# Patient Record
Sex: FEMALE | Race: BLACK OR AFRICAN AMERICAN | Employment: FULL TIME | URBAN - METROPOLITAN AREA
[De-identification: names, ages, dates, MRNs, and addresses within clinical notes are randomized per-mention and may not be internally consistent; named-entity substitution may affect disease eponyms.]

---

## 2017-02-06 ENCOUNTER — OFFICE VISIT (OUTPATIENT)
Dept: OBGYN CLINIC | Age: 27
End: 2017-02-06

## 2017-02-06 VITALS
BODY MASS INDEX: 26.25 KG/M2 | HEIGHT: 68 IN | WEIGHT: 173.2 LBS | SYSTOLIC BLOOD PRESSURE: 120 MMHG | DIASTOLIC BLOOD PRESSURE: 72 MMHG | HEART RATE: 92 BPM

## 2017-02-06 DIAGNOSIS — Z30.09 FAMILY PLANNING: ICD-10-CM

## 2017-02-06 DIAGNOSIS — Z30.09 FAMILY PLANNING: Primary | ICD-10-CM

## 2017-02-07 ENCOUNTER — CLINICAL SUPPORT (OUTPATIENT)
Dept: OBGYN CLINIC | Age: 27
End: 2017-02-07

## 2017-02-07 VITALS
HEART RATE: 81 BPM | SYSTOLIC BLOOD PRESSURE: 122 MMHG | BODY MASS INDEX: 26.22 KG/M2 | HEIGHT: 68 IN | DIASTOLIC BLOOD PRESSURE: 79 MMHG | WEIGHT: 173 LBS

## 2017-02-07 DIAGNOSIS — Z30.09 FAMILY PLANNING: Primary | ICD-10-CM

## 2017-02-07 LAB
HCG INTACT+B SERPL-ACNC: <1 MIU/ML
HCG URINE, QL. (POC): NEGATIVE
VALID INTERNAL CONTROL?: YES

## 2017-05-02 ENCOUNTER — CLINICAL SUPPORT (OUTPATIENT)
Dept: OBGYN CLINIC | Age: 27
End: 2017-05-02

## 2017-05-02 VITALS
HEIGHT: 68 IN | WEIGHT: 163.8 LBS | HEART RATE: 97 BPM | SYSTOLIC BLOOD PRESSURE: 113 MMHG | DIASTOLIC BLOOD PRESSURE: 70 MMHG | BODY MASS INDEX: 24.83 KG/M2

## 2017-05-02 DIAGNOSIS — Z30.42 DEPO-PROVERA CONTRACEPTIVE STATUS: Primary | ICD-10-CM

## 2017-05-02 LAB
HCG URINE, QL. (POC): NEGATIVE
VALID INTERNAL CONTROL?: YES

## 2017-05-02 NOTE — PROGRESS NOTES
Patient received depo injection, tolerated well, left with no complications.  Next depo is due Jul 21- Aug 4

## 2017-07-10 ENCOUNTER — HOSPITAL ENCOUNTER (EMERGENCY)
Age: 27
Discharge: HOME | End: 2017-07-10
Attending: EMERGENCY MEDICINE
Payer: MEDICAID

## 2017-07-10 VITALS
TEMPERATURE: 98.9 F | SYSTOLIC BLOOD PRESSURE: 135 MMHG | WEIGHT: 162 LBS | HEIGHT: 68 IN | DIASTOLIC BLOOD PRESSURE: 83 MMHG | BODY MASS INDEX: 24.55 KG/M2 | HEART RATE: 87 BPM | OXYGEN SATURATION: 100 % | RESPIRATION RATE: 16 BRPM

## 2017-07-10 DIAGNOSIS — T85.848A DENTAL IMPLANT PAIN, INITIAL ENCOUNTER: Primary | ICD-10-CM

## 2017-07-10 PROCEDURE — 99282 EMERGENCY DEPT VISIT SF MDM: CPT

## 2017-07-10 RX ORDER — PENICILLIN V POTASSIUM 500 MG/1
500 TABLET, FILM COATED ORAL 4 TIMES DAILY
Qty: 28 TAB | Refills: 0 | Status: SHIPPED | OUTPATIENT
Start: 2017-07-10 | End: 2017-07-17

## 2017-07-10 RX ORDER — TRAMADOL HYDROCHLORIDE 50 MG/1
50 TABLET ORAL
Qty: 20 TAB | Refills: 0 | Status: SHIPPED | OUTPATIENT
Start: 2017-07-10

## 2017-07-10 NOTE — ED PROVIDER NOTES
HPI Comments: Pt with complaint of R upper molar dental pain. She states she does not have a primary dentist \"but I've called around\". She denies any fevers or chills, is able to tolerate PO and secretions. No other acute symptoms or complaints were noted. Past Medical History:   Diagnosis Date    Pyelonephritis 6/9/2016       No past surgical history on file. Family History:   Problem Relation Age of Onset    No Known Problems Mother     No Known Problems Father        Social History     Social History    Marital status: SINGLE     Spouse name: N/A    Number of children: N/A    Years of education: N/A     Occupational History    Not on file. Social History Main Topics    Smoking status: Never Smoker    Smokeless tobacco: Never Used    Alcohol use No    Drug use: No    Sexual activity: Yes     Partners: Male     Birth control/ protection: Condom     Other Topics Concern    Not on file     Social History Narrative         ALLERGIES: Onion    Review of Systems   Constitutional: Negative for chills and fever. HENT: Positive for dental problem. Negative for drooling and trouble swallowing. Eyes: Negative for visual disturbance. Respiratory: Negative for shortness of breath. Cardiovascular: Negative for chest pain. Gastrointestinal: Negative for abdominal pain, nausea and vomiting. Endocrine: Negative. Genitourinary: Negative. Musculoskeletal: Negative. Skin: Negative for rash. Allergic/Immunologic: Negative. Neurological: Negative for headaches. Hematological: Negative for adenopathy. Vitals:    07/10/17 1254   BP: 135/83   Pulse: 87   Resp: 16   Temp: 98.9 °F (37.2 °C)   SpO2: 100%   Weight: 73.5 kg (162 lb)   Height: 5' 8\" (1.727 m)            Physical Exam   Constitutional: She is oriented to person, place, and time. She appears well-developed and well-nourished. No distress.    Resting comfortably on stretcher   HENT:   Head: Normocephalic and atraumatic. MM moist.  Decayed and broken R upper posterior molar. No evidence of periapical abscess   Eyes: Conjunctivae and EOM are normal.   Sclera clear bilaterally   Neck: Neck supple. No JVD present. Non-tender to palpation   Cardiovascular: Normal rate, regular rhythm and normal heart sounds. Exam reveals no gallop and no friction rub. No murmur heard. Pulmonary/Chest: Effort normal and breath sounds normal. No respiratory distress. She has no wheezes. She has no rales. She exhibits no tenderness. No crepitance with palpation   Abdominal: Soft. She exhibits no distension. There is no tenderness. Genitourinary:   Genitourinary Comments: No CVA tenderness   Musculoskeletal: She exhibits no tenderness. Normal inspection of upper extremities. No edema noted to bilateral lower extremities   Lymphadenopathy:     She has no cervical adenopathy. Neurological: She is alert and oriented to person, place, and time. Normal motor and sensation bilaterally to upper and lower extremities   Skin: Skin is warm and dry. She is not diaphoretic. Psychiatric:   Normal mood and affect. Vitals reviewed. MDM  Number of Diagnoses or Management Options  Dental implant pain, initial encounter:   Diagnosis management comments: Pt with dental pain and broken tooth, no systemic symptoms.  Will Rx for symptoms and refer to dentist.    Risk of Complications, Morbidity, and/or Mortality  Presenting problems: low  Management options: low    Patient Progress  Patient progress: stable    ED Course       Procedures

## 2017-07-25 ENCOUNTER — TELEPHONE (OUTPATIENT)
Dept: OBGYN CLINIC | Age: 27
End: 2017-07-25

## 2017-07-25 DIAGNOSIS — Z30.09 GENERAL COUNSELING AND ADVICE FOR CONTRACEPTIVE MANAGEMENT: ICD-10-CM

## 2017-07-25 DIAGNOSIS — Z30.09 FAMILY PLANNING: ICD-10-CM

## 2017-07-25 RX ORDER — MEDROXYPROGESTERONE ACETATE 150 MG/ML
150 INJECTION, SUSPENSION INTRAMUSCULAR ONCE
Qty: 1 ML | Refills: 3 | Status: SHIPPED | OUTPATIENT
Start: 2017-07-25 | End: 2017-07-25

## 2017-07-26 ENCOUNTER — CLINICAL SUPPORT (OUTPATIENT)
Dept: OBGYN CLINIC | Age: 27
End: 2017-07-26

## 2017-07-26 VITALS
SYSTOLIC BLOOD PRESSURE: 117 MMHG | WEIGHT: 161.25 LBS | HEART RATE: 80 BPM | BODY MASS INDEX: 24.44 KG/M2 | DIASTOLIC BLOOD PRESSURE: 71 MMHG | HEIGHT: 68 IN

## 2017-07-26 DIAGNOSIS — Z30.09 FAMILY PLANNING: Primary | ICD-10-CM

## 2017-07-26 LAB
HCG URINE, QL. (POC): NEGATIVE
VALID INTERNAL CONTROL?: YES

## 2017-07-27 DIAGNOSIS — Z30.09 FAMILY PLANNING: Primary | ICD-10-CM

## 2017-07-27 RX ORDER — MEDROXYPROGESTERONE ACETATE 150 MG/ML
150 INJECTION, SUSPENSION INTRAMUSCULAR ONCE
Qty: 1 ML | Refills: 6 | Status: SHIPPED | OUTPATIENT
Start: 2017-07-27 | End: 2017-07-27

## 2017-10-16 ENCOUNTER — CLINICAL SUPPORT (OUTPATIENT)
Dept: OBGYN CLINIC | Age: 27
End: 2017-10-16

## 2017-10-16 VITALS
WEIGHT: 160 LBS | BODY MASS INDEX: 25.71 KG/M2 | HEIGHT: 66 IN | DIASTOLIC BLOOD PRESSURE: 74 MMHG | HEART RATE: 86 BPM | SYSTOLIC BLOOD PRESSURE: 118 MMHG

## 2017-10-16 DIAGNOSIS — Z30.42 FAMILY PLANNING, DEPO-PROVERA CONTRACEPTION MONITORING/ADMINISTRATION: Primary | ICD-10-CM

## 2017-10-16 LAB
HCG URINE, QL. (POC): NEGATIVE
VALID INTERNAL CONTROL?: YES

## 2018-01-09 ENCOUNTER — CLINICAL SUPPORT (OUTPATIENT)
Dept: OBGYN CLINIC | Age: 28
End: 2018-01-09

## 2018-01-09 VITALS
HEIGHT: 66 IN | BODY MASS INDEX: 26.13 KG/M2 | WEIGHT: 162.6 LBS | SYSTOLIC BLOOD PRESSURE: 116 MMHG | HEART RATE: 74 BPM | DIASTOLIC BLOOD PRESSURE: 70 MMHG

## 2018-01-09 DIAGNOSIS — Z30.09 FAMILY PLANNING: Primary | ICD-10-CM

## 2018-01-09 NOTE — PROGRESS NOTES
Pt in today for Depo Injection. Noted that the pt's last Well Woman was 8/24/16. Pt made aware that she will need to schedule a Well Woman and be seen before any other refill or injections will be approved. Next  Pt verbalized understanding. Injection given in Left Deltoid without any complaints, Pt tolerated well and left office in stable condition.

## 2018-01-09 NOTE — MR AVS SNAPSHOT
Visit Information Date & Time Provider Department Dept. Phone Encounter #  
 1/9/2018  3:00 PM Sherryle SoleDamion [id number] Follow-up Instructions Return in about 3 months (around 4/9/2018) for depo. Your Appointments 1/25/2018 10:00 AM  
ANNUAL with Sherryle Sole, MD  
[facility] ([address] [place] [address]) Appt Note: annual  
 [address], [place] [address] [place] [address] [facility] [address]  
[phone]  
  
   
 [address], [address] [address] [address] [address]  
  
    
 4/2/2018  2:15 PM  
DEPO with Kennedy Berumen CNM [facility] ([facility] Appt Note: depo [address], [place] [address] [facility] [address]  
[phone]  
  
   
 [address], [address] [address] [address] [address] Upcoming Health Maintenance Date Due  
 HPV AGE 9Y-34Y (1 of 3 - Female 3 Dose Series) 2/5/2002 Influenza Age 5 to Adult 8/1/2017 PAP AKA CERVICAL CYTOLOGY 4/13/2018 Allergies as of 1/9/2018  Review Complete On: 7/27/2017 By: Sherryle Sole, MD  
  
 Severity Noted Reaction Type Reactions Onion Low 06/29/2016    Rash, Itching Current Immunizations  Reviewed on 6/9/2016 No immunizations on file. Not reviewed this visit You Were Diagnosed With   
  
 Codes Comments Family planning    -  Primary ICD-10-CM: Z30.09 
ICD-9-CM: V25.09 Vitals BP Pulse Height(growth percentile) Weight(growth percentile) BMI OB Status 116/70 (BP 1 Location: Right arm, BP Patient Position: Sitting) 74 5' 6\" (1.676 m) 162 lb 9.6 oz (73.8 kg) 26.24 kg/m2 Injection Smoking Status Never Smoker BMI and BSA Data Body Mass Index Body Surface Area  
 26.24 kg/m 2 1.85 m 2 Preferred Pharmacy Pharmacy Name Phone [address], [address] [facility] [phone] [facility] [phone] Your Updated Medication List  
 This list is accurate as of: 1/9/18 11:59 PM.  Always use your most recent med list.  
  
  
  
  
 ferrous sulfate 325 mg (65 mg iron) tablet Take 1 Tab by mouth two (2) times daily (with meals). ibuprofen 800 mg tablet Commonly known as:  MOTRIN Take 1 Tab by mouth every eight (8) hours as needed. oxyCODONE-acetaminophen 5-325 mg per tablet Commonly known as:  PERCOCET Take 1-2 Tabs by mouth every six (6) hours as needed. Max Daily Amount: 8 Tabs. prenatal vit-calcium-iron-fa 27 mg iron- 1 mg Tab Commonly known as:  PRENATAL VITAMIN PLUS LOW IRON Take 1 Tab by mouth daily. Indications: PREGNANCY  
  
 senna-docusate 8.6-50 mg per tablet Commonly known as:  Mariangel Arm Take 1 Tab by mouth two (2) times daily as needed for Constipation. traMADol 50 mg tablet Commonly known as:  ULTRAM  
Take 1 Tab by mouth every six (6) hours as needed for Pain. Max Daily Amount: 200 mg. We Performed the Following IN MEDROXYPROGESTERONE ACETATE, 1MG [ [facility]] IN THER/PROPH/DIAG INJECTION, SUBCUT/IM [id number] CPT(R)] Follow-up Instructions Return in about 3 months (around 4/9/2018) for depo. Patient Instructions Shot for [employer] Control: Care Instructions Your Care Instructions The shot is used to prevent pregnancy. You get the shot in your upper arm or rear end (buttocks). The shot gives you a dose of the hormone progestin. The shot is often called by its brand name, Depo-Provera. The shot provides birth control for 3 months at a time. You then need another shot. Follow-up care is a key part of your treatment and safety. Be sure to make and go to all appointments, and call your doctor if you are having problems. It's also a good idea to know your test results and keep a list of the medicines you take. How can you care for yourself at home? How do you use the birth control shot? · If you get the shot during the first 5 days of your normal period, use backup birth control, such as a condom, or don't have intercourse for 24 hours. · If you get the shot more than 5 days after your periods starts, use backup birth control or don't have intercourse for 5 days. · Talk to your doctor about a schedule to get the shot. You need the shot every 3 months. If you are late getting it, you'll need backup birth control. What if you miss or are late for a shot? Always read the label for specific instructions, or call your doctor. Here are some basic guidelines: · Use backup birth control, such as a condom, or don't have intercourse. Continue using one of these methods until 5 days after you get the missed or late shot. · If you had intercourse, you can use emergency contraception, such as the morning-after pill (Plan B). You can use emergency contraception for up to 5 days after having had sex, but it works best if you take it right away. What else do you need to know? · The shot has side effects. Because the shot protects for 3 months, the side effects may last 3 months. ¨ You may have changes in your period and your period may stop. You may also have spotting or bleeding between periods. ¨ You may have mood changes, less interest in sex, or weight gain. · The shot may cause bone loss. Talk to your doctor about this and take steps to prevent bone loss, such as getting enough calcium in your diet and exercising regularly. · Check with your doctor before you use any other medicines, including over-the-counter medicines, vitamins, herbal products, and supplements. Birth control hormones may not work as well to prevent pregnancy when combined with other medicines. · The shot doesn't protect against sexually transmitted infections (STIs), such as herpes or HIV/AIDS. If you're not sure whether your sex partner might have an STI, use a condom to protect against infection. When should you call for help? Watch closely for changes in your health, and be sure to contact your doctor if you have any problems. Where can you learn more? Go to http://mariah-garfield.info/. Enter D220 in the search box to learn more about \"Shot for Birth Control: Care Instructions. \" Current as of: March 16, 2017 Content Version: 11.4 © 7155-0616 [employer]. Care instructions adapted under license by [employer] (which disclaims liability or warranty for this information). If you have questions about a medical condition or this instruction, always ask your healthcare professional. [place] [address] any warranty or liability for your use of this information. Introducing [facility]! [address] introduces [employer] patient portal. Now you can access parts of your medical record, email your doctor's office, and request medication refills online. 1. In your internet browser, go to https://[employer]. "[employer] 2. Click on the First Time User? Click Here link in the Sign In box. You will see the New Member Sign Up page. 3. Enter your [employer] Access Code exactly as it appears below. You will not need to use this code after youve completed the sign-up process. If you do not sign up before the expiration date, you must request a new code. · [employer] Access Code: [email or web address] Expires: 4/14/2018  2:55 PM 
 
4. Enter the last four digits of your Social Security Number (xxxx) and Date of Birth (mm/dd/yyyy) as indicated and click Submit. You will be taken to the next sign-up page. 5. Create a [employer] ID. This will be your [employer] login ID and cannot be changed, so think of one that is secure and easy to remember. 6. Create a [employer] password. You can change your password at any time. 7. Enter your Password Reset Question and Answer. This can be used at a later time if you forget your password. 8. Enter your e-mail address. You will receive e-mail notification when new information is available in [address] 9. Click Sign Up. You can now view and download portions of your medical record. 10. Click the Download Summary menu link to download a portable copy of your medical information. If you have questions, please visit the Frequently Asked Questions section of the [employer] website. Remember, [employer] is NOT to be used for urgent needs. For medical emergencies, dial 911. Now available from your iPhone and Android! Please provide this summary of care documentation to your next provider. Your primary care clinician is listed as [address]. If you have any questions after today's visit, please call [phone].

## 2018-05-07 ENCOUNTER — OFFICE VISIT (OUTPATIENT)
Dept: OBGYN CLINIC | Age: 28
End: 2018-05-07

## 2018-05-07 VITALS
RESPIRATION RATE: 16 BRPM | DIASTOLIC BLOOD PRESSURE: 76 MMHG | OXYGEN SATURATION: 100 % | WEIGHT: 161.6 LBS | SYSTOLIC BLOOD PRESSURE: 125 MMHG | HEART RATE: 89 BPM | BODY MASS INDEX: 25.97 KG/M2 | TEMPERATURE: 97.7 F | HEIGHT: 66 IN

## 2018-05-07 DIAGNOSIS — Z01.419 WELL WOMAN EXAM WITH ROUTINE GYNECOLOGICAL EXAM: Primary | ICD-10-CM

## 2018-05-07 DIAGNOSIS — Z01.419 WELL WOMAN EXAM WITH ROUTINE GYNECOLOGICAL EXAM: ICD-10-CM

## 2018-05-07 DIAGNOSIS — Z30.09 FAMILY PLANNING: ICD-10-CM

## 2018-05-07 LAB
HCG URINE, QL. (POC): NEGATIVE
VALID INTERNAL CONTROL?: YES

## 2018-05-07 NOTE — PROGRESS NOTES
Subjective:   33 y.o. female for Well Woman Check. No LMP recorded. Patient has had an injection. Social History: single partner, contraception - Depo-Provera injections. Pertinent past medical hstory: no history of HTN, DVT, CAD, DM, liver disease, migraines or smoking. ROS:  Feeling well. No dyspnea or chest pain on exertion. No abdominal pain, change in bowel habits, black or bloody stools. No urinary tract symptoms. GYN ROS: normal menses, no abnormal bleeding, pelvic pain or discharge, no breast pain or new or enlarging lumps on self exam. No neurological complaints. Objective:     Visit Vitals    /76 (BP 1 Location: Left arm, BP Patient Position: Sitting)    Pulse 89    Temp 97.7 °F (36.5 °C) (Oral)    Resp 16    Ht 5' 6\" (1.676 m)    Wt 161 lb 9.6 oz (73.3 kg)    SpO2 100%    BMI 26.08 kg/m2     The patient appears well, alert, oriented x 3, in no distress. ENT normal.  Neck supple. No adenopathy or thyromegaly. PAUL. Lungs are clear, good air entry, no wheezes, rhonchi or rales. S1 and S2 normal, no murmurs, regular rate and rhythm. Abdomen soft without tenderness, guarding, mass or organomegaly. Extremities show no edema, normal peripheral pulses. Neurological is normal, no focal findings. BREAST EXAM: breasts appear normal, no suspicious masses, no skin or nipple changes or axillary nodes    PELVIC EXAM: normal external genitalia, vulva, vagina, cervix, uterus and adnexa    Assessment/Plan:   well woman  Family Planning. pap smear  return annually or prn    ICD-10-CM ICD-9-CM    1. Well woman exam with routine gynecological exam Z01.419 V72.31 PAP IG, CT-NG-TV, RFX APTIMA HPV ASCUS ([id number])   2. Family planning Z30.09 V25.09 AMB POC URINE PREGNANCY TEST, VISUAL COLOR COMPARISON      DC MEDROXYPROGESTERONE ACETATE, 1MG      DC THER/PROPH/DIAG INJECTION, SUBCUT/IM   3.  Well woman exam with routine gynecological exam Z01.419 V72.31 PAP IG, CT-NG-TV, RFX APTIMA HPV ASCUS ([id number])    [V72.31]   .

## 2018-05-07 NOTE — PATIENT INSTRUCTIONS
Shot for [employer] Control: Care Instructions  Your Care Instructions  The shot is used to prevent pregnancy. You get the shot in your upper arm or rear end (buttocks). The shot gives you a dose of the hormone progestin. The shot is often called by its brand name, Depo-Provera. The shot provides birth control for 3 months at a time. You then need another shot. Follow-up care is a key part of your treatment and safety. Be sure to make and go to all appointments, and call your doctor if you are having problems. It's also a good idea to know your test results and keep a list of the medicines you take. How can you care for yourself at home? How do you use the birth control shot? · If you get the shot during the first 5 days of your normal period, use backup birth control, such as a condom, or don't have intercourse for 24 hours. · If you get the shot more than 5 days after your periods starts, use backup birth control or don't have intercourse for 5 days. · Talk to your doctor about a schedule to get the shot. You need the shot every 3 months. If you are late getting it, you'll need backup birth control. What if you miss or are late for a shot? Always read the label for specific instructions, or call your doctor. Here are some basic guidelines:  · Use backup birth control, such as a condom, or don't have intercourse. Continue using one of these methods until 5 days after you get the missed or late shot. · If you had intercourse, you can use emergency contraception, such as the morning-after pill (Plan B). You can use emergency contraception for up to 5 days after having had sex, but it works best if you take it right away. What else do you need to know? · The shot has side effects. Because the shot protects for 3 months, the side effects may last 3 months. ¨ You may have changes in your period and your period may stop. You may also have spotting or bleeding between periods.   ¨ You may have mood changes, less interest in sex, or weight gain. · The shot may cause bone loss. Talk to your doctor about this and take steps to prevent bone loss, such as getting enough calcium in your diet and exercising regularly. · Check with your doctor before you use any other medicines, including over-the-counter medicines, vitamins, herbal products, and supplements. Birth control hormones may not work as well to prevent pregnancy when combined with other medicines. · The shot doesn't protect against sexually transmitted infections (STIs), such as herpes or HIV/AIDS. If you're not sure whether your sex partner might have an STI, use a condom to protect against infection. When should you call for help? Watch closely for changes in your health, and be sure to contact your doctor if you have any problems. Where can you learn more? Go to http://mariah-garfield.info/. Enter G743 in the search box to learn more about \"Shot for Birth Control: Care Instructions. \"  Current as of: March 16, 2017  Content Version: 11.4  © 8204-6875 [employer]. Care instructions adapted under license by [employer] (which disclaims liability or warranty for this information). If you have questions about a medical condition or this instruction, always ask your healthcare professional. [place] [address] any warranty or liability for your use of this information. Well Visit, Ages 25 to 48: Care Instructions  Your Care Instructions    Physical exams can help you stay healthy. Your doctor has checked your overall health and may have suggested ways to take good care of yourself. He or she also may have recommended tests. At home, you can help prevent illness with healthy eating, regular exercise, and other steps. Follow-up care is a key part of your treatment and safety. Be sure to make and go to all appointments, and call your doctor if you are having problems.  It's also a good idea to know your test results and keep a list of the medicines you take. How can you care for yourself at home? · Reach and stay at a healthy weight. This will lower your risk for many problems, such as obesity, diabetes, heart disease, and high blood pressure. · Get at least 30 minutes of physical activity on most days of the week. Walking is a good choice. You also may want to do other activities, such as running, swimming, cycling, or playing tennis or team sports. Discuss any changes in your exercise program with your doctor. · Do not smoke or allow others to smoke around you. If you need help quitting, talk to your doctor about stop-smoking programs and medicines. These can increase your chances of quitting for good. · Talk to your doctor about whether you have any risk factors for sexually transmitted infections (STIs). Having one sex partner (who does not have STIs and does not have sex with anyone else) is a good way to avoid these infections. · Use birth control if you do not want to have children at this time. Talk with your doctor about the choices available and what might be best for you. · Protect your skin from too much sun. When you're outdoors from 10 a.m. to 4 p.m., stay in the shade or cover up with clothing and a hat with a wide brim. Wear sunglasses that block UV rays. Even when it's cloudy, put broad-spectrum sunscreen (SPF 30 or higher) on any exposed skin. · See a dentist one or two times a year for checkups and to have your teeth cleaned. · Wear a seat belt in the car. · Drink alcohol in moderation, if at all. That means no more than 2 drinks a day for men and 1 drink a day for women. Follow your doctor's advice about when to have certain tests. These tests can spot problems early. For everyone  · Cholesterol. Have the fat (cholesterol) in your blood tested after age 21.  Your doctor will tell you how often to have this done based on your age, family history, or other things that can increase your risk for heart disease. · Blood pressure. Have your blood pressure checked during a routine doctor visit. Your doctor will tell you how often to check your blood pressure based on your age, your blood pressure results, and other factors. · Vision. Talk with your doctor about how often to have a glaucoma test.  · Diabetes. Ask your doctor whether you should have tests for diabetes. · Colon cancer. Have a test for colon cancer at age 48. You may have one of several tests. If you are younger than 48, you may need a test earlier if you have any risk factors. Risk factors include whether you already had a precancerous polyp removed from your colon or whether your parent, brother, sister, or child has had colon cancer. For women  · Breast exam and mammogram. Talk to your doctor about when you should have a clinical breast exam and a mammogram. Medical experts differ on whether and how often women under 50 should have these tests. Your doctor can help you decide what is right for you. · Pap test and pelvic exam. Begin Pap tests at age 24. A Pap test is the best way to find cervical cancer. The test often is part of a pelvic exam. Ask how often to have this test.  · Tests for sexually transmitted infections (STIs). Ask whether you should have tests for STIs. You may be at risk if you have sex with more than one person, especially if your partners do not wear condoms. For men  · Tests for sexually transmitted infections (STIs). Ask whether you should have tests for STIs. You may be at risk if you have sex with more than one person, especially if you do not wear a condom. · Testicular cancer exam. Ask your doctor whether you should check your testicles regularly. · Prostate exam. Talk to your doctor about whether you should have a blood test (called a PSA test) for prostate cancer.  Experts differ on whether and when men should have this test. Some experts suggest it if you are older than 39 and are -American or have a father or brother who got prostate cancer when he was younger than 72. When should you call for help? Watch closely for changes in your health, and be sure to contact your doctor if you have any problems or symptoms that concern you. Where can you learn more? Go to http://mariah-garfield.info/. Enter P072 in the search box to learn more about \"Well Visit, Ages 25 to 48: Care Instructions. \"  Current as of: May 12, 2017  Content Version: 11.4  © 5945-0336 [employer]wise[employer]. Care instructions adapted under license by [employer] (which disclaims liability or warranty for this information). If you have questions about a medical condition or this instruction, always ask your healthcare professional. [address] any warranty or liability for your use of this information.

## 2018-05-07 NOTE — MR AVS SNAPSHOT
[facility] 
 
 
 [address], [address] [facility] [address] 
[phone] Patient: Saleem Blount MRN: [id number] KGF:[birth year] Visit Information Date & Time Provider Department Dept. Phone Encounter #  
 5/7/2018  3:30 PM Damion Adair [id number] Follow-up Instructions Return in 3 months (on 8/7/2018). Your Appointments 7/23/2018  3:30 PM  
DEPO with Jie Inman MD  
[facility] ([address]) Appt Note: DEPO  
 [address] [facility] [address]  
[phone]  
  
   
 [address], [address] [place] Upcoming Health Maintenance Date Due  
 PAP AKA CERVICAL CYTOLOGY 4/13/2018 Influenza Age 5 to Adult 8/1/2018 Allergies as of 5/7/2018  Review Complete On: 5/7/2018 By: Jie Inman MD  
  
 Severity Noted Reaction Type Reactions Onion Low 06/29/2016    Rash, Itching Current Immunizations  Reviewed on 6/9/2016 No immunizations on file. Not reviewed this visit You Were Diagnosed With   
  
 Codes Comments Well woman exam with routine gynecological exam    -  Primary ICD-10-CM: O42.799 ICD-9-CM: V72.31 Family planning     ICD-10-CM: Z30.09 
ICD-9-CM: V25.09 Well woman exam with routine gynecological exam     ICD-10-CM: I20.917 ICD-9-CM: V72.31 [V72.31] Vitals BP Pulse Temp Resp Height(growth percentile) Weight(growth percentile) 125/76 (BP 1 Location: Left arm, BP Patient Position: Sitting) 89 97.7 °F (36.5 °C) (Oral) 16 5' 6\" (1.676 m) 161 lb 9.6 oz (73.3 kg) SpO2 BMI OB Status Smoking Status 100% 26.08 kg/m2 Injection Never Smoker BMI and BSA Data Body Mass Index Body Surface Area 26.08 kg/m 2 1.85 m 2 Preferred Pharmacy Pharmacy Name Phone  [address] [facility] [phone] Your Updated Medication List  
  
   
This list is accurate as of 5/7/18 10:50 PM.  Always use your most recent med list.  
  
  
  
  
 ferrous sulfate 325 mg (65 mg iron) tablet Take 1 Tab by mouth two (2) times daily (with meals). ibuprofen 800 mg tablet Commonly known as:  MOTRIN Take 1 Tab by mouth every eight (8) hours as needed. oxyCODONE-acetaminophen 5-325 mg per tablet Commonly known as:  PERCOCET Take 1-2 Tabs by mouth every six (6) hours as needed. Max Daily Amount: 8 Tabs. prenatal vit-calcium-iron-fa 27 mg iron- 1 mg Tab Commonly known as:  PRENATAL VITAMIN PLUS LOW IRON Take 1 Tab by mouth daily. Indications: PREGNANCY  
  
 senna-docusate 8.6-50 mg per tablet Commonly known as:  [place] Take 1 Tab by mouth two (2) times daily as needed for Constipation. traMADol 50 mg tablet Commonly known as:  ULTRAM  
Take 1 Tab by mouth every six (6) hours as needed for Pain. Max Daily Amount: 200 mg. We Performed the Following AMB POC URINE PREGNANCY TEST, VISUAL COLOR COMPARISON [75703 CPT(R)] NC MEDROXYPROGESTERONE ACETATE, 1MG [ [facility]] NC THER/PROPH/DIAG INJECTION, SUBCUT/IM [id number] CPT(R)] Follow-up Instructions Return in 3 months (on 8/7/2018). Patient Instructions Shot for [employer] Control: Care Instructions Your Care Instructions The shot is used to prevent pregnancy. You get the shot in your upper arm or rear end (buttocks). The shot gives you a dose of the hormone progestin. The shot is often called by its brand name, Depo-Provera. The shot provides birth control for 3 months at a time. You then need another shot. Follow-up care is a key part of your treatment and safety. Be sure to make and go to all appointments, and call your doctor if you are having problems. It's also a good idea to know your test results and keep a list of the medicines you take. How can you care for yourself at home? How do you use the birth control shot? · If you get the shot during the first 5 days of your normal period, use backup birth control, such as a condom, or don't have intercourse for 24 hours. · If you get the shot more than 5 days after your periods starts, use backup birth control or don't have intercourse for 5 days. · Talk to your doctor about a schedule to get the shot. You need the shot every 3 months. If you are late getting it, you'll need backup birth control. What if you miss or are late for a shot? Always read the label for specific instructions, or call your doctor. Here are some basic guidelines: · Use backup birth control, such as a condom, or don't have intercourse. Continue using one of these methods until 5 days after you get the missed or late shot. · If you had intercourse, you can use emergency contraception, such as the morning-after pill (Plan B). You can use emergency contraception for up to 5 days after having had sex, but it works best if you take it right away. What else do you need to know? · The shot has side effects. Because the shot protects for 3 months, the side effects may last 3 months. ¨ You may have changes in your period and your period may stop. You may also have spotting or bleeding between periods. ¨ You may have mood changes, less interest in sex, or weight gain. · The shot may cause bone loss. Talk to your doctor about this and take steps to prevent bone loss, such as getting enough calcium in your diet and exercising regularly. · Check with your doctor before you use any other medicines, including over-the-counter medicines, vitamins, herbal products, and supplements. Birth control hormones may not work as well to prevent pregnancy when combined with other medicines. · The shot doesn't protect against sexually transmitted infections (STIs), such as herpes or HIV/AIDS.  If you're not sure whether your sex partner might have an STI, use a condom to protect against infection. When should you call for help? Watch closely for changes in your health, and be sure to contact your doctor if you have any problems. Where can you learn more? Go to http://mariah-garfield.info/. Enter L760 in the search box to learn more about \"Shot for Birth Control: Care Instructions. \" Current as of: March 16, 2017 Content Version: 11.4 © 6581-8469 [employer]. Care instructions adapted under license by [employer] (which disclaims liability or warranty for this information). If you have questions about a medical condition or this instruction, always ask your healthcare professional. [address] any warranty or liability for your use of this information. Well Visit, Ages 25 to 48: Care Instructions Your Care Instructions Physical exams can help you stay healthy. Your doctor has checked your overall health and may have suggested ways to take good care of yourself. He or she also may have recommended tests. At home, you can help prevent illness with healthy eating, regular exercise, and other steps. Follow-up care is a key part of your treatment and safety. Be sure to make and go to all appointments, and call your doctor if you are having problems. It's also a good idea to know your test results and keep a list of the medicines you take. How can you care for yourself at home? · Reach and stay at a healthy weight. This will lower your risk for many problems, such as obesity, diabetes, heart disease, and high blood pressure. · Get at least 30 minutes of physical activity on most days of the week. Walking is a good choice. You also may want to do other activities, such as running, swimming, cycling, or playing tennis or team sports. Discuss any changes in your exercise program with your doctor. · Do not smoke or allow others to smoke around you.  If you need help quitting, talk to your doctor about stop-smoking programs and medicines. These can increase your chances of quitting for good. · Talk to your doctor about whether you have any risk factors for sexually transmitted infections (STIs). Having one sex partner (who does not have STIs and does not have sex with anyone else) is a good way to avoid these infections. · Use birth control if you do not want to have children at this time. Talk with your doctor about the choices available and what might be best for you. · Protect your skin from too much sun. When you're outdoors from 10 a.m. to 4 p.m., stay in the shade or cover up with clothing and a hat with a wide brim. Wear sunglasses that block UV rays. Even when it's cloudy, put broad-spectrum sunscreen (SPF 30 or higher) on any exposed skin. · See a dentist one or two times a year for checkups and to have your teeth cleaned. · Wear a seat belt in the car. · Drink alcohol in moderation, if at all. That means no more than 2 drinks a day for men and 1 drink a day for women. Follow your doctor's advice about when to have certain tests. These tests can spot problems early. For everyone · Cholesterol. Have the fat (cholesterol) in your blood tested after age 21. Your doctor will tell you how often to have this done based on your age, family history, or other things that can increase your risk for heart disease. · Blood pressure. Have your blood pressure checked during a routine doctor visit. Your doctor will tell you how often to check your blood pressure based on your age, your blood pressure results, and other factors. · Vision. Talk with your doctor about how often to have a glaucoma test. 
· Diabetes. Ask your doctor whether you should have tests for diabetes. · Colon cancer. Have a test for colon cancer at age 48. You may have one of several tests.  If you are younger than 48, you may need a test earlier if you have any risk factors. Risk factors include whether you already had a precancerous polyp removed from your colon or whether your parent, brother, sister, or child has had colon cancer. For women · Breast exam and mammogram. Talk to your doctor about when you should have a clinical breast exam and a mammogram. Medical experts differ on whether and how often women under 50 should have these tests. Your doctor can help you decide what is right for you. · Pap test and pelvic exam. Begin Pap tests at age 24. A Pap test is the best way to find cervical cancer. The test often is part of a pelvic exam. Ask how often to have this test. 
· Tests for sexually transmitted infections (STIs). Ask whether you should have tests for STIs. You may be at risk if you have sex with more than one person, especially if your partners do not wear condoms. For men · Tests for sexually transmitted infections (STIs). Ask whether you should have tests for STIs. You may be at risk if you have sex with more than one person, especially if you do not wear a condom. · Testicular cancer exam. Ask your doctor whether you should check your testicles regularly. · Prostate exam. Talk to your doctor about whether you should have a blood test (called a PSA test) for prostate cancer. Experts differ on whether and when men should have this test. Some experts suggest it if you are older than 39 and are -American or have a father or brother who got prostate cancer when he was younger than 72. When should you call for help? Watch closely for changes in your health, and be sure to contact your doctor if you have any problems or symptoms that concern you. Where can you learn more? Go to http://mariah-garfield.info/. Enter P072 in the search box to learn more about \"Well Visit, Ages 25 to 48: Care Instructions. \" Current as of: May 12, 2017 Content Version: 11.4 © 3080-1664 [employer]wise[employer] Incorporated. Care instructions adapted under license by [employer] (which disclaims liability or warranty for this information). If you have questions about a medical condition or this instruction, always ask your healthcare professional. [address] any warranty or liability for your use of this information. Introducing [facility]! [facility] introduces DossierView patient portal. Now you can access parts of your medical record, email your doctor's office, and request medication refills online. 1. In your internet browser, go to [email or web address] Watch-Sites/[email or web address] 2. Click on the First Time User? Click Here link in the Sign In box. You will see the New Member Sign Up page. 3. Enter your DossierView Access Code exactly as it appears below. You will not need to use this code after youve completed the sign-up process. If you do not sign up before the expiration date, you must request a new code. · DossierView Access Code: [email or web address] Expires: 7/21/2018  5:24 AM 
 
4. Enter the last four digits of your Social Security Number (xxxx) and Date of Birth (mm/dd/yyyy) as indicated and click Submit. You will be taken to the next sign-up page. 5. Create a DossierView ID. This will be your DossierView login ID and cannot be changed, so think of one that is secure and easy to remember. 6. Create a DossierView password. You can change your password at any time. 7. Enter your Password Reset Question and Answer. This can be used at a later time if you forget your password. 8. Enter your e-mail address. You will receive e-mail notification when new information is available in [address] 9. Click Sign Up. You can now view and download portions of your medical record. 10. Click the Download Summary menu link to download a portable copy of your medical information.  
 
If you have questions, please visit the Frequently Asked Questions section of the [employer]. Remember, [employer] is NOT to be used for urgent needs. For medical emergencies, dial 911. Now available from your iPhone and Android! Please provide this summary of care documentation to your next provider. Your primary care clinician is listed as [address]. If you have any questions after today's visit, please call [phone].

## 2018-05-23 LAB
C TRACH RRNA CVX QL NAA+PROBE: NEGATIVE
CYTOLOGIST CVX/VAG CYTO: NORMAL
CYTOLOGY CVX/VAG DOC THIN PREP: NORMAL
DX ICD CODE: NORMAL
LABCORP, 190119: NORMAL
Lab: NORMAL
N GONORRHOEA RRNA CVX QL NAA+PROBE: NEGATIVE
OTHER STN SPEC: NORMAL
PATH REPORT.FINAL DX SPEC: NORMAL
STAT OF ADQ CVX/VAG CYTO-IMP: NORMAL
T VAGINALIS RRNA SPEC QL NAA+PROBE: NEGATIVE

## 2018-08-13 RX ORDER — MEDROXYPROGESTERONE ACETATE 150 MG/ML
150 INJECTION, SUSPENSION INTRAMUSCULAR
Qty: 1 ML | Refills: 3 | Status: SHIPPED | OUTPATIENT
Start: 2018-08-13 | End: 2018-08-23 | Stop reason: SDUPTHER

## 2018-08-23 RX ORDER — MEDROXYPROGESTERONE ACETATE 150 MG/ML
150 INJECTION, SUSPENSION INTRAMUSCULAR
Qty: 1 ML | Refills: 3 | Status: SHIPPED | OUTPATIENT
Start: 2018-08-23

## 2018-08-24 ENCOUNTER — CLINICAL SUPPORT (OUTPATIENT)
Dept: OBGYN CLINIC | Age: 28
End: 2018-08-24

## 2018-08-24 VITALS
BODY MASS INDEX: 25.75 KG/M2 | DIASTOLIC BLOOD PRESSURE: 76 MMHG | HEIGHT: 66 IN | TEMPERATURE: 98.5 F | OXYGEN SATURATION: 100 % | WEIGHT: 160.2 LBS | RESPIRATION RATE: 18 BRPM | HEART RATE: 76 BPM | SYSTOLIC BLOOD PRESSURE: 127 MMHG

## 2018-08-24 DIAGNOSIS — Z30.42 ENCOUNTER FOR DEPO-PROVERA CONTRACEPTION: Primary | ICD-10-CM

## 2018-08-24 LAB
HCG URINE, QL. (POC): NEGATIVE
VALID INTERNAL CONTROL?: YES

## 2018-08-24 RX ORDER — MEDROXYPROGESTERONE ACETATE 150 MG/ML
150 INJECTION, SUSPENSION INTRAMUSCULAR ONCE
Qty: 1 ML | Refills: 0 | Status: SHIPPED | COMMUNITY
Start: 2018-08-24 | End: 2018-08-24

## 2018-08-24 NOTE — PROGRESS NOTES
Date last pap: 05/07/18. Last Depo-Provera: 05/07/18. Side Effects if any: n/a. Serum HCG indicated? n/a. Depo-Provera 150 mg IM given by: Jess Linn RN . Next appointment due 11/09-11/23. Injection given in LEFT deltoid, patient tolerated well and left in no distress.

## 2018-12-10 ENCOUNTER — CLINICAL SUPPORT (OUTPATIENT)
Dept: OBGYN CLINIC | Age: 28
End: 2018-12-10

## 2018-12-10 VITALS
SYSTOLIC BLOOD PRESSURE: 119 MMHG | HEIGHT: 66 IN | HEART RATE: 87 BPM | BODY MASS INDEX: 26.45 KG/M2 | OXYGEN SATURATION: 100 % | TEMPERATURE: 98.8 F | RESPIRATION RATE: 18 BRPM | DIASTOLIC BLOOD PRESSURE: 78 MMHG | WEIGHT: 164.6 LBS

## 2018-12-10 DIAGNOSIS — Z30.09 FAMILY PLANNING: Primary | ICD-10-CM

## 2018-12-10 LAB
HCG URINE, QL. (POC): NEGATIVE
VALID INTERNAL CONTROL?: YES

## 2018-12-10 NOTE — PROGRESS NOTES
Pt in for Depo injection. Injection given in right deltoid. Pt tolerated well, voiced no complaints or concerns,made aware that her next Depo is due between Fe. [id number]. Pt verbalized understanding.

## 2020-01-01 ENCOUNTER — APPOINTMENT (OUTPATIENT)
Dept: GENERAL RADIOLOGY | Age: 30
End: 2020-01-01
Attending: NURSE PRACTITIONER
Payer: MEDICAID

## 2020-01-01 ENCOUNTER — HOSPITAL ENCOUNTER (EMERGENCY)
Age: 30
Discharge: HOME | End: 2020-01-01
Attending: EMERGENCY MEDICINE
Payer: MEDICAID

## 2020-01-01 VITALS
SYSTOLIC BLOOD PRESSURE: 133 MMHG | DIASTOLIC BLOOD PRESSURE: 81 MMHG | RESPIRATION RATE: 18 BRPM | HEART RATE: 91 BPM | TEMPERATURE: 98.6 F | OXYGEN SATURATION: 100 %

## 2020-01-01 DIAGNOSIS — S63.630A SPRAIN OF INTERPHALANGEAL JOINT OF RIGHT INDEX FINGER, INITIAL ENCOUNTER: Primary | ICD-10-CM

## 2020-01-01 PROCEDURE — 99281 EMR DPT VST MAYX REQ PHY/QHP: CPT

## 2020-01-01 PROCEDURE — 73140 X-RAY EXAM OF FINGER(S): CPT

## 2020-01-01 RX ORDER — IBUPROFEN 800 MG/1
800 TABLET ORAL
Qty: 20 TAB | Refills: 0 | Status: SHIPPED | OUTPATIENT
Start: 2020-01-01 | End: 2020-01-08

## 2020-01-02 NOTE — DISCHARGE INSTRUCTIONS
Patient Education        Finger Sprain: Care Instructions  Overview    A sprain is an injury to the tough fibers (ligaments) that connect bone to bone. This injury can happen in joints such as in your finger. Some sprains stretch the ligaments but don't tear them. More severe sprains can partly or completely tear the ligaments. Sprains can cause pain and swelling. It may take weeks to months before your finger can move easily and without pain. Resting the finger for a short time after the injury can help you heal. To keep the injured finger in position while it heals, your doctor may have put a splint on it. Or the doctor may have taped the finger to the one next to it. After the pain and swelling have gone down, your doctor may recommend exercises to strengthen your finger or more treatment if needed. Follow-up care is a key part of your treatment and safety. Be sure to make and go to all appointments, and call your doctor if you are having problems. It's also a good idea to know your test results and keep a list of the medicines you take. How can you care for yourself at home? · If your doctor put a splint on your finger, wear the splint as directed. Don't remove it until your doctor says it's okay. · If your fingers are taped together, make sure that the tape is snug. But it shouldn't be so tight that the fingers get numb or tingle. You can loosen the tape if it's too tight. If you need to retape your fingers, always put padding between the fingers before you put on the new tape. · Put ice or a cold pack on your finger for 10 to 20 minutes at a time. Try to do this every 1 to 2 hours for the first 3 days (when you are awake) or until the swelling goes down. Put a thin cloth between the ice and your skin. · Prop up your hand on a pillow when you ice it or anytime you sit or lie down during the next 3 days. Try to keep it above the level of your heart. This will help reduce swelling.   · Be safe with medicines. Read and follow all instructions on the label. ? If the doctor gave you a prescription medicine for pain, take it as prescribed. ? If you are not taking a prescription pain medicine, ask your doctor if you can take an over-the-counter medicine. · If your doctor recommends exercises, do them as directed. When should you call for help? Call your doctor now or seek immediate medical care if:    · You have new or worse pain.     · Your finger is cool or pale or changes color.     · Your finger is tingly, weak, or numb.    Watch closely for changes in your health, and be sure to contact your doctor if:    · You do not get better as expected. Where can you learn more? Go to http://mariah-garfield.info/. Enter F155 in the search box to learn more about \"Finger Sprain: Care Instructions. \"  Current as of: June 26, 2019  Content Version: 12.2  © 4213-8611 [employer]. Care instructions adapted under license by [employer] (which disclaims liability or warranty for this information). If you have questions about a medical condition or this instruction, always ask your healthcare professional. [place] [address] any warranty or liability for your use of this information.

## 2020-01-02 NOTE — ED TRIAGE NOTES
Patient A/O x 4, complaining of right index finger swelling and pain x Monday. Patient states her finger got jammed in daughter's car seat.

## 2020-09-25 ENCOUNTER — OFFICE VISIT (OUTPATIENT)
Dept: ORTHOPEDIC SURGERY | Age: 30
End: 2020-09-25
Payer: MEDICAID

## 2020-09-25 VITALS
WEIGHT: 147.4 LBS | HEART RATE: 61 BPM | TEMPERATURE: 97.7 F | HEIGHT: 66 IN | DIASTOLIC BLOOD PRESSURE: 78 MMHG | BODY MASS INDEX: 23.69 KG/M2 | OXYGEN SATURATION: 97 % | SYSTOLIC BLOOD PRESSURE: 110 MMHG

## 2020-09-25 DIAGNOSIS — S83.241A ACUTE MEDIAL MENISCUS TEAR OF RIGHT KNEE, INITIAL ENCOUNTER: Primary | ICD-10-CM

## 2020-09-25 DIAGNOSIS — S83.241A ACUTE MEDIAL MENISCUS TEAR OF RIGHT KNEE, INITIAL ENCOUNTER: ICD-10-CM

## 2020-09-25 PROCEDURE — 99203 OFFICE O/P NEW LOW 30 MIN: CPT | Performed by: PHYSICIAN ASSISTANT

## 2020-09-25 NOTE — LETTER
NOTIFICATION RETURN TO WORK / SCHOOL 
 
9/25/2020 2:03 PM 
 
Ms. Darryle Music [address] [facility] [address] [facility] [address] To Whom It May Concern: 
 
Darryle Music is currently under the care of Ofe Nguyen. She was seen in our office for an appointment today, 9/25/2020. She will be out of work x 2 weeks. If there are questions or concerns please have the patient contact our office. Sincerely, BIA Stiles

## 2020-09-25 NOTE — PROGRESS NOTES
Emani Gonzales  1990   Chief Complaint   Patient presents with    Knee Pain     right knee pain        HISTORY OF PRESENT ILLNESS  Emani Gonzales is a 35 y.o. female who presents today for evaluation of right knee pain. Pain started on 9/5. She was on a motor scooter and put her foot down and started having sharp pains. Patient describes the pain as sharp and stabbing that is Constant in nature. Symptoms are worse with weight bearing, walking and is better with  rest.  Associated symptoms include weakness, Swelling. Since problem started, it: is unchanged. Pain does not wake patient up at night. Has taken nsaids for the problem. Pain is a 8/10. Has tried following treatments: Injections:NO; Brace:YES; Therapy:NO; Cane/Crutch:NO   Pain Assessment  9/25/2020   Location of Pain Knee   Location Modifiers Right   Severity of Pain 8   Quality of Pain Sharp; Throbbing   Duration of Pain Persistent   Duration of Pain Comment patient only experiences pain when she puts weight on her right side and walking   Date Pain First Started 9/5/2020   Aggravating Factors Walking   Limiting Behavior Yes   Relieving Factors Heat   Result of Injury Yes           Allergies   Allergen Reactions    Onion Rash and Itching        Past Medical History:   Diagnosis Date    Pyelonephritis 6/9/2016      Social History     Socioeconomic History    Marital status: SINGLE     Spouse name: Not on file    Number of children: Not on file    Years of education: Not on file    Highest education level: Not on file   Occupational History    Not on file   Social Needs    Financial resource strain: Not on file    Food insecurity     Worry: Not on file     Inability: Not on file    Transportation needs     Medical: Not on file     Non-medical: Not on file   Tobacco Use    Smoking status: Never Smoker    Smokeless tobacco: Never Used   Substance and Sexual Activity    Alcohol use: No    Drug use: No    Sexual activity: Yes Partners: Male     Birth control/protection: Condom   Lifestyle    Physical activity     Days per week: Not on file     Minutes per session: Not on file    Stress: Not on file   Relationships    Social connections     Talks on phone: Not on file     Gets together: Not on file     Attends Alevism service: Not on file     Active member of club or organization: Not on file     Attends meetings of clubs or organizations: Not on file     Relationship status: Not on file    Intimate partner violence     Fear of current or ex partner: Not on file     Emotionally abused: Not on file     Physically abused: Not on file     Forced sexual activity: Not on file   Other Topics Concern    Not on file   Social History Narrative    Not on file      History reviewed. No pertinent surgical history. Family History   Problem Relation Age of Onset    No Known Problems Mother     No Known Problems Father       Current Outpatient Medications   Medication Sig    medroxyPROGESTERone (DEPO-PROVERA) 150 mg/mL injection 1 mL by IntraMUSCular route every three (3) months.  traMADol (ULTRAM) 50 mg tablet Take 1 Tab by mouth every six (6) hours as needed for Pain. Max Daily Amount: 200 mg.  ferrous sulfate 325 mg (65 mg iron) tablet Take 1 Tab by mouth two (2) times daily (with meals).  ibuprofen (MOTRIN) 800 mg tablet Take 1 Tab by mouth every eight (8) hours as needed.  oxyCODONE-acetaminophen (PERCOCET) 5-325 mg per tablet Take 1-2 Tabs by mouth every six (6) hours as needed. Max Daily Amount: 8 Tabs.  senna-docusate (PERICOLACE) 8.6-50 mg per tablet Take 1 Tab by mouth two (2) times daily as needed for Constipation.  prenatal vit-calcium-iron-fa (PRENATAL VITAMINS LOW IRON) 27 mg iron- 1 mg tab Take 1 Tab by mouth daily. Indications: PREGNANCY     No current facility-administered medications for this visit.         REVIEW OF SYSTEM   Patient denies: Weight loss, Fever/Chills, HA, Visual changes, Fatigue, Chest pain, SOB, Abdominal pain, N/V/D/C, Blood in stool or urine, Edema. Pertinent positive as above in HPI. All others were negative    PHYSICAL EXAM:   Visit Vitals  /78 (BP 1 Location: Right arm, BP Patient Position: Sitting)   Pulse 61   Temp 97.7 °F (36.5 °C) (Temporal)   Ht 5' 6\" (1.676 m)   Wt 147 lb 6.4 oz (66.9 kg)   SpO2 97%   BMI 23.79 kg/m²     The patient is a well-developed, well-nourished female   in no acute distress. The patient is alert and oriented times three. The patient is alert and oriented times three. Mood and affect are normal.  LYMPHATIC: lymph nodes are not enlarged and are within normal limits  SKIN: normal in color and non tender to palpation. There are no bruises or abrasions noted. NEUROLOGICAL: Motor sensory exam is within normal limits. Reflexes are equal bilaterally. There is normal sensation to pinprick and light touch  MUSCULOSKELETAL:  Examination Right knee   Skin Intact   Range of motion 0-130   Effusion +   Medial joint line tenderness +   Lateral joint line tenderness -   Tenderness Pes Bursa -   Tenderness insertion MCL -   Tenderness insertion LCL -   Elianas +   Patella crepitus -   Patella grind -   Lachman -   Pivot shift -   Anterior drawer -   Posterior drawer -   Varus stress -   Valgus stress -   Neurovascular Intact   Calf Swelling and Tenderness to Palpation -   Mo's Test -   Hamstring Cord Tightness -            IMAGIN view xray of right knee taken at [place] read and reviewed by myself reveal no acute abnormalities. IMPRESSION:      ICD-10-CM ICD-9-CM    1. Acute medial meniscus tear of right knee, initial encounter  S83.241A 836.0 MRI KNEE RT WO CONT        PLAN:   1. Patient with effusion in knee despite home exercise plan and nsaids. Will order MRI r/o MMT  Risk factors include: n/a  2. No cortisone injection indicated today   3. No Physical/Occupational Therapy indicated today  4. YES diagnostic test indicated today:   5.  No durable medical equipment indicated today  6. No referral indicated today   7. Yes medications indicated today: voltaren gel OTC  8.  No Narcotic indicated today    RTC after MRI with [facility] and Spine Specialist

## 2020-10-09 ENCOUNTER — HOSPITAL ENCOUNTER (OUTPATIENT)
Age: 30
Discharge: HOME | End: 2020-10-09
Attending: PHYSICIAN ASSISTANT
Payer: MEDICAID

## 2020-10-09 PROCEDURE — 73721 MRI JNT OF LWR EXTRE W/O DYE: CPT

## 2020-10-12 ENCOUNTER — OFFICE VISIT (OUTPATIENT)
Dept: ORTHOPEDIC SURGERY | Age: 30
End: 2020-10-12
Payer: MEDICAID

## 2020-10-12 VITALS
HEART RATE: 77 BPM | SYSTOLIC BLOOD PRESSURE: 112 MMHG | DIASTOLIC BLOOD PRESSURE: 85 MMHG | WEIGHT: 147 LBS | OXYGEN SATURATION: 99 % | TEMPERATURE: 97.2 F | BODY MASS INDEX: 23.07 KG/M2 | HEIGHT: 67 IN

## 2020-10-12 DIAGNOSIS — M22.2X1 PATELLOFEMORAL PAIN SYNDROME OF RIGHT KNEE: Primary | ICD-10-CM

## 2020-10-12 PROCEDURE — 99213 OFFICE O/P EST LOW 20 MIN: CPT | Performed by: ORTHOPAEDIC SURGERY

## 2020-10-12 RX ORDER — MELOXICAM 15 MG/1
15 TABLET ORAL
Qty: 30 TAB | Refills: 1 | Status: SHIPPED | OUTPATIENT
Start: 2020-10-12
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
## 2020-10-20 ENCOUNTER — HOSPITAL ENCOUNTER (OUTPATIENT)
Dept: PHYSICAL THERAPY | Age: 30
Discharge: HOME | End: 2020-10-20
Payer: MEDICAID

## 2020-10-20 PROCEDURE — 97161 PT EVAL LOW COMPLEX 20 MIN: CPT
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
## 2020-10-30 ENCOUNTER — HOSPITAL ENCOUNTER (OUTPATIENT)
Dept: PHYSICAL THERAPY | Age: 30
Discharge: HOME | End: 2020-10-30
Payer: MEDICAID

## 2020-10-30 PROCEDURE — 97112 NEUROMUSCULAR REEDUCATION: CPT

## 2020-10-30 PROCEDURE — 97110 THERAPEUTIC EXERCISES: CPT

## 2020-10-30 NOTE — PROGRESS NOTES
PT DAILY TREATMENT NOTE 10-18    Patient Name: Baron Saxena  Date:10/30/2020  : 1990  [x]  Patient  Verified  Payor: BLUE CROSS MEDICAID / Plan: [facility] HEALTHKEEPERS PLUS / Product Type: Managed Care Medicaid /    In time:12:48  Out time:1:28  Total Treatment Time (min): 40  Visit #: 2 of 8           Medicare/BCBS Only   Total Timed Codes (min):  40 1:1 Treatment Time:  40          Treatment Area: Right knee pain [M25.561]    SUBJECTIVE  Pain Level (0-10 scale): 5  Any medication changes, allergies to medications, adverse drug reactions, diagnosis change, or new procedure performed?: [x] No    [] Yes (see summary sheet for update)  Subjective functional status/changes:   [] No changes reported  Pt reports her pain usually hangs out around a 5/10 all the time. OBJECTIVE      15 min Therapeutic Exercise:  [x] See flow sheet :   Rationale: increase ROM and increase strength to improve the patients ability to perform functional task with ease     17 min Neuromuscular Re-education:  -  See flow sheet :   Rationale: increase strength, improve coordination, improve balance and increase proprioception  to improve the patients ability to tolerate ADL's.    8 min Manual Therapy:   Gentle med/Lat patellar mobs, STM to distal quad and medial knee in supine   Rationale: decrease pain, increase ROM and increase tissue extensibility to improve functional mobility with ambulation ADL's. With   [] TE   [] TA   [] neuro   [] other: Patient Education: [x] Review HEP    [] Progressed/Changed HEP based on:   [] positioning   [] body mechanics   [] transfers   [] heat/ice application    [] other:      Other Objective/Functional Measures: imitated therex per flowsheet     Pain Level (0-10 scale) post treatment: 7    ASSESSMENT/Changes in Function:   First f/u session with pt able to tolerate most standing exercises but noting increasing pain with WB on the right LE.  Pt reports continued discomfort at the medial knee noted more with LAQ but pt able to complete exercises. Pain with medial patella mobs noted during manual with pt slightly TTP at the medial knee. Offloading on the right LE noted with Bridges and squats due to pain in the right knee. Pt reports partial HEP compliance. Some increased pain post treatment in the right knee but pt left in no apparent distress. Patient will continue to benefit from skilled PT services to modify and progress therapeutic interventions, address functional mobility deficits, address ROM deficits, address strength deficits, analyze and address soft tissue restrictions, analyze and cue movement patterns, analyze and modify body mechanics/ergonomics and assess and modify postural abnormalities to attain remaining goals. [x]  See Plan of Care  []  See progress note/recertification  []  See Discharge Summary         Progress towards goals / Updated goals:  Short Term Goals: To be accomplished in 2 weeks:  1. Pt will report compliance with HEP, performing at least once daily, to maximize therapeutic success. Eval: HEP assigned   Current: progressing 10/30/20 Partial compliance  Long Term Goals: To be accomplished in 4 weeks:  1. Pt will improve FOTO to 69, demonstrating improved functional capacity for ADLs. Eval: 60  2. Pt will improve right LE strength to 4+/5 MMT or better without pain increase to improve ease of return to work without restrictions. Eval: knee flex/ext 4-/5, hip flex 4-/5 with pain, hip ext 3+/5, hip abd 4-/5  3. Pt will improve right knee flexion to 130 degrees or better without pain increase to improve was of squatting and lifting at work. Eval: 0-124 degrees with pain  4. Pt will perform SLS on airex for 30 seconds or greater without LOB, demonstrating improved knee stability for dynamic ADLs. Eval: not tested  5.  Pt will report pain no greater than 2/10 to improve QOL and ease of household tasks.               Eval: 5-10/10 pain       PLAN  []  Upgrade activities as tolerated     [x]  Continue plan of care  []  Update interventions per flow sheet       []  Discharge due to:_  []  Other:_      Soila Ash PTA 10/30/2020  12:48 PM    No future appointments.

## 2020-11-03 ENCOUNTER — HOSPITAL ENCOUNTER (OUTPATIENT)
Dept: PHYSICAL THERAPY | Age: 30
Discharge: HOME | End: 2020-11-03
Payer: MEDICAID

## 2020-11-03 PROCEDURE — 97112 NEUROMUSCULAR REEDUCATION: CPT

## 2020-11-03 PROCEDURE — 97110 THERAPEUTIC EXERCISES: CPT

## 2020-11-03 PROCEDURE — 97140 MANUAL THERAPY 1/> REGIONS: CPT

## 2020-11-03 NOTE — PROGRESS NOTES
PT DAILY TREATMENT NOTE     Patient Name: Baron Saxena  Date:11/3/2020  : 1990  [x]  Patient  Verified  Payor: BLUE CROSS MEDICAID / Plan: [facility] HEALTHKEEPERS PLUS / Product Type: Managed Care Medicaid /    In time:10:36  Out time:11:24  Total Treatment Time (min): 48  Visit #: 3 of 8    Medicare/BCBS Only   Total Timed Codes (min):  38 1:1 Treatment Time:  38       Treatment Area: Right knee pain [M25.561]    SUBJECTIVE  Pain Level (0-10 scale): 7/10  Any medication changes, allergies to medications, adverse drug reactions, diagnosis change, or new procedure performed?: [x] No    [] Yes (see summary sheet for update)  Subjective functional status/changes:   [] No changes reported  \"It's more sore today, I had to help lift someone at work this morning. \"    OBJECTIVE    Modality rationale: decrease inflammation and decrease pain to improve the patients ability to perform ADL's.    Min Type Additional Details    [] Estim:  []Unatt       []IFC  []Premod                        []Other:  []w/ice   []w/heat  Position:  Location:    [] Estim: []Att    []TENS instruct  []NMES                    []Other:  []w/US   []w/ice   []w/heat  Position:  Location:    []  Traction: [] Cervical       []Lumbar                       [] Prone          []Supine                       []Intermittent   []Continuous Lbs:  [] before manual  [] after manual    []  Ultrasound: []Continuous   [] Pulsed                           []1MHz   []3MHz W/cm2:  Location:    []  Iontophoresis with dexamethasone         Location: [] Take home patch   [] In clinic   10 [x]  Ice     []  heat  []  Ice massage  []  Laser   []  Anodyne Position: Supine  Location: Right knee    []  Laser with stim  []  Other:  Position:  Location:    []  Vasopneumatic Device Pressure:       [] lo [] med [] hi   Temperature: [] lo [] med [] hi   [] Skin assessment post-treatment:  []intact []redness- no adverse reaction    []redness  adverse reaction:     22 min Therapeutic Exercise:  [x] See flow sheet :   Rationale: increase ROM and increase strength to improve the patients ability to perform ADL's.    8 min Neuromuscular Re-education:  [x]  See flow sheet : Eccentric step downs, bandwalks. Rationale: increase strength, improve balance and increase proprioception  to improve the patients ability to perform functional activities. 8 min Manual Therapy:  Right patellar mobs, STM distal quads, knee PROM. Pt supine. The manual therapy interventions were performed at a separate and distinct time from the therapeutic activities interventions. Rationale: decrease pain, increase ROM and increase tissue extensibility to improve ease of performing functional activities. With   [x] TE   [] TA   [] neuro   [] other: Patient Education: [x] Review HEP    [] Progressed/Changed HEP based on:   [] positioning   [] body mechanics   [] transfers   [] heat/ice application    [] other:      Other Objective/Functional Measures: AROM Right knee 0-133 degrees without pain increasing. Pain Level (0-10 scale) post treatment: 7/10    ASSESSMENT/Changes in Function: Improved knee ROM without provocation of pain. Pt denies pain while non-weight bearing. Expressed a decrease in pain level however stated a 7/10 post-treatment. Continue PT to increase Right LE stability to improve ease of performing ADL's. Patient will continue to benefit from skilled PT services to modify and progress therapeutic interventions, address functional mobility deficits, address ROM deficits, address strength deficits, analyze and address soft tissue restrictions and analyze and modify body mechanics/ergonomics to attain remaining goals. [x]  See Plan of Care  []  See progress note/recertification  []  See Discharge Summary         Progress towards goals / Updated goals:  Short Term Goals: To be accomplished in 2 weeks:  1.  Pt will report compliance with HEP, performing at least once daily, to maximize therapeutic success.              Eval: HEP assigned              Current: progressing 10/30/20 Partial compliance  Long Term Goals: To be accomplished in 4 weeks:  1. Pt will improve FOTO to 69, demonstrating improved functional capacity for ADLs.             Eval: 60  2. Pt will improve right LE strength to 4+/5 MMT or better without pain increase to improve ease of return to work without restrictions.              Eval: knee flex/ext 4-/5, hip flex 4-/5 with pain, hip ext 3+/5, hip abd 4-/5  3. Pt will improve right knee flexion to 130 degrees or better without pain increase to improve was of squatting and lifting at work.  Satira Crater: 0-124 degrees with pain   Current: AROM Right knee 0-133 degrees without pain increasing. 11/3/2020  4. Pt will perform SLS on airex for 30 seconds or greater without LOB, demonstrating improved knee stability for dynamic ADLs.             Eval: not tested  5.  Pt will report pain no greater than 2/10 to improve QOL and ease of household tasks.               Eval: 5-10/10 pain    PLAN  []  Upgrade activities as tolerated     [x]  Continue plan of care  []  Update interventions per flow sheet       []  Discharge due to:_  []  Other:_      Carl Jackson, PTA 11/3/2020  10:38 AM    Future Appointments   Date Time Provider Zandra Rodrigez   11/11/2020 11:15 AM Tomeka Sims PTA [facility]   11/13/2020 11:15 AM Leonor Doty, PT [facility]   11/18/2020 11:15 AM Tomeka Sims PTA [facility]   11/20/2020 10:45 AM Tomeka Sims PTA [facility]   11/24/2020 11:15 AM Tom Renner, PTA [facility]

## 2020-11-13 ENCOUNTER — HOSPITAL ENCOUNTER (OUTPATIENT)
Dept: PHYSICAL THERAPY | Age: 30
Discharge: HOME | End: 2020-11-13
Payer: MEDICAID

## 2020-11-13 PROCEDURE — 97530 THERAPEUTIC ACTIVITIES: CPT

## 2020-11-13 PROCEDURE — 97110 THERAPEUTIC EXERCISES: CPT

## 2020-11-13 PROCEDURE — 97112 NEUROMUSCULAR REEDUCATION: CPT

## 2020-11-13 NOTE — PROGRESS NOTES
PT DAILY TREATMENT NOTE     Patient Name: Welton Krabbe  Date:2020  : 1990  [x]  Patient  Verified  Payor: BLUE CROSS MEDICAID / Plan: [facility] [employer] HEALTHKEEPERS PLUS / Product Type: Managed Care Medicaid /    In time:1116  Out time:1210  Total Treatment Time (min): 47  Visit #: 4 of 8    Treatment Area: Right knee pain [M25.561]    SUBJECTIVE  Pain Level (0-10 scale): 3  Any medication changes, allergies to medications, adverse drug reactions, diagnosis change, or new procedure performed?: [x] No    [] Yes (see summary sheet for update)   Subjective functional status/changes:   [] No changes reported  Has been stretching more frequently at home and doing some squatting for HEP. Will still get sharp pains but not as often. Notes difference in ability to single leg stance with feeling unstable. OBJECTIVE    Modality rationale: decrease inflammation and decrease pain to improve the patients ability to perform self care. Min Type Additional Details   10 []  Ice     [x]  heat  []  Ice massage  []  Laser   []  Anodyne Position: supine  Location:  Right knee   [] Skin assessment post-treatment:  []intact []redness- no adverse reaction    []redness  adverse reaction:   23 min Therapeutic Exercise:  [x]? See flow sheet :   Rationale: increase ROM and increase strength to improve the patients ability to perform ADL's.    8 min Therapeutic Activities:  [x]? See flow sheet : forward and lateral step ups; step downs   Rationale: increase ROM and increase strength to improve the patients ability to perform ADL's.     8 min Neuromuscular Re-education:  [x]? See flow sheet : bandwalks, SLS   Rationale: increase strength, improve balance and increase proprioception  to improve the patients ability to perform functional activities.     5 min Manual Therapy:  Right patellar mobs, STM distal quads. Pt supine.    Rationale: decrease pain, increase ROM and increase tissue extensibility to improve ease of performing functional activities. With   [] TE   [] TA   [] neuro   [] other: Patient Education: [x] Review HEP    [] Progressed/Changed HEP based on:   [] positioning   [] body mechanics   [] transfers   [] heat/ice application    [] other:      Other Objective/Functional Measures: left SLS 12 sec     Pain Level (0-10 scale) post treatment: 3    ASSESSMENT/Changes in Function: Some valgus collapse is noted at the right knee with eccentric step downs and with theraband walks. Pt verbally and tactilely cued to maintain knee in a neutral position to improve biomechanical control. She does note increased fatigue and \"tightness\" at the distal quads and medial knee with straight leg raises and step up exercises. Utilized heat today at end of session per pt request as she notes pain improvement at home with heat. No swelling present at knee. Patient will continue to benefit from skilled PT services to modify and progress therapeutic interventions, address functional mobility deficits, address ROM deficits, address strength deficits, analyze and address soft tissue restrictions, analyze and cue movement patterns, analyze and modify body mechanics/ergonomics, assess and modify postural abnormalities, address imbalance/dizziness and instruct in home and community integration to attain remaining goals. [x]  See Plan of Care  []  See progress note/recertification  []  See Discharge Summary         Progress towards goals / Updated goals:  Short Term Goals: To be accomplished in 2 weeks:  1. Pt will report compliance with HEP, performing at least once daily, to maximize therapeutic success.              Eval: HEP assigned              Current: progressing, stretching more often at home (11/13/2020)  [address] [facility] be accomplished in 4 weeks:  1. Pt will improve FOTO to 69, demonstrating improved functional capacity for ADLs.             Eval: 60  2.  Pt will improve right LE strength to 4+/5 MMT or better without pain increase to improve ease of return to work without restrictions.              Eval: knee flex/ext 4-/5, hip flex 4-/5 with pain, hip ext 3+/5, hip abd 4-/5  3. Pt will improve right knee flexion to 130 degrees or better without pain increase to improve was of squatting and lifting at work.  Ortiz Sandoval: 0-124 degrees with pain              Current: AROM Right knee 0-133 degrees without pain increasing. 11/3/2020  4. Pt will perform SLS on airex for 30 seconds or greater without LOB, demonstrating improved knee stability for dynamic ADLs.             Eval: not tested   Current: left LE 30 seconds, right LE 12 seconds firm ground (11/13/2020)  5.  Pt will report pain no greater than 2/10 to improve QOL and ease of household tasks.               Eval: 5-10/10 pain   Current: progressing, 3/10 reported today (11/13/2020)    PLAN  []  Upgrade activities as tolerated     [x]  Continue plan of care  []  Update interventions per flow sheet       []  Discharge due to:_  []  Other:_      Jacobo Prabhakar, PT 11/13/2020  11:18 AM    Future Appointments   Date Time Provider Zandra Rodrigez   11/18/2020 11:15 AM [facility]   11/20/2020 10:45 AM Janet Puga PTA [facility]   11/24/2020 11:15 AM Dudley Naylor PTA [facility]

## 2020-11-18 ENCOUNTER — HOSPITAL ENCOUNTER (OUTPATIENT)
Dept: PHYSICAL THERAPY | Age: 30
Discharge: HOME | End: 2020-11-18
Payer: MEDICAID

## 2020-11-18 PROCEDURE — 97530 THERAPEUTIC ACTIVITIES: CPT

## 2020-11-18 PROCEDURE — 97110 THERAPEUTIC EXERCISES: CPT

## 2020-11-18 PROCEDURE — 97112 NEUROMUSCULAR REEDUCATION: CPT

## 2020-11-18 NOTE — PROGRESS NOTES
PT DAILY TREATMENT NOTE     Patient Name: Kelin Sorto  Date:2020  : 1990  [x]  Patient  Verified  Payor: BLUE CROSS MEDICAID / Plan: [facility] [place] [employer]KEEPERS PLUS / Product Type: Managed Care Medicaid /    In time:11:15  Out time:11:57  Total Treatment Time (min): 42  Visit #: 5 of 8    Treatment Area: Right knee pain [M25.561]    SUBJECTIVE  Pain Level (0-10 scale): 4/10  Any medication changes, allergies to medications, adverse drug reactions, diagnosis change, or new procedure performed?: [x] No    [] Yes (see summary sheet for update)  Subjective functional status/changes:   [x] No changes reported    OBJECTIVE    22 min Therapeutic Exercise:  [x] See flow sheet :   Rationale: increase ROM and increase strength to improve the patients ability to perform ADL's. 10 min Therapeutic Activity:  [x]  See flow sheet : Bandwalks, negotiated stairs. Rationale: increase strength, improve coordination and increase proprioception  to improve the patients ability to perform functional activities. 10 min Neuromuscular Re-education:  [x]  See flow sheet : SLS, eccentric step downs. Rationale: increase strength, improve balance and increase proprioception  to improve the patients ability to perform functional activities. With   [x] TE   [] TA   [] neuro   [] other: Patient Education: [x] Review HEP    [] Progressed/Changed HEP based on:   [] positioning   [] body mechanics   [] transfers   [] heat/ice application    [] other:      Other Objective/Functional Measures: MMT Right Knee flex 4/5, ext 4-/5, hip flex 4/5, ext 4-/5, abd 4-/5. Reviewed exercise mechanics and the importance of not \"locking\" genus. Changed HR's to SL and added yellow t-band resistance to hip 3-way and bandwalks. Complained mostly about feeling uncomfortable and weak, felt challenged with exercises.     Pain Level (0-10 scale) post treatment: 0/10    ASSESSMENT/Changes in Function: Pt reports no change in symptoms however pt did confirm that sharp pain has decreased in frequency. Demonstrates improved strength however continues to be limited. Pt reports understanding purpose of strengthening Right LE and with decreasing habitual genu recurvatum. Patient will continue to benefit from skilled PT services to modify and progress therapeutic interventions, address functional mobility deficits, address ROM deficits, address strength deficits, analyze and cue movement patterns and analyze and modify body mechanics/ergonomics to attain remaining goals. [x]  See Plan of Care   []  See progress note/recertification  []  See Discharge Summary         Progress towards goals / Updated goals:  Short Term Goals: To be accomplished in 2 weeks:  1. Pt will report compliance with HEP, performing at least once daily, to maximize therapeutic success.              Eval: HEP assigned              Current: progressing, stretching more often at home (2020)  [address] be accomplished in 4 weeks:  1. Pt will improve FOTO to 69, demonstrating improved functional capacity for ADLs.             Eval: 60  2. Pt will improve right LE strength to 4+/5 MMT or better without pain increase to improve ease of return to work without restrictions.              Eval: knee flex/ext 4-/5, hip flex 4-/5 with pain, hip ext 3+/5, hip abd 4-/5   Current: MMT Right Knee flex 4/5, ext 4-/5, hip flex 4/5, ext 4-/5, abd 4-/5.  2020  3. Pt will improve right knee flexion to 130 degrees or better without pain increase to improve was of squatting and lifting at work.  Marie Starrin-124 degrees with pain              Current: AROM Right knee 0-133 degrees without pain increasing. 11/3/2020  4. Pt will perform SLS on airex for 30 seconds or greater without LOB, demonstrating improved knee stability for dynamic ADLs.             Eval: not tested              Current: left LE 30 seconds, right LE 12 seconds firm ground (2020)  5.  Pt will report pain no greater than 2/10 to improve QOL and ease of household tasks.               Eval: 5-10/10 pain              Current: progressing, 3/10 reported today (11/13/2020)    PLAN  []  Upgrade activities as tolerated     [x]  Continue plan of care  []  Update interventions per flow sheet       []  Discharge due to:_  []  Other:_      Giovanni Benoit PTA 11/18/2020  11:15 AM    Future Appointments   Date Time Provider Zandra Rodrigez   11/20/2020 10:45 AM Vero Ren PTA [facility]   11/24/2020 11:15 AM Jacquelin Ferrari PTA [facility]

## 2020-11-20 ENCOUNTER — HOSPITAL ENCOUNTER (OUTPATIENT)
Dept: PHYSICAL THERAPY | Age: 30
Discharge: HOME | End: 2020-11-20
Payer: MEDICAID

## 2020-11-20 PROCEDURE — 97110 THERAPEUTIC EXERCISES: CPT

## 2020-11-20 PROCEDURE — 97530 THERAPEUTIC ACTIVITIES: CPT

## 2020-11-20 PROCEDURE — 97112 NEUROMUSCULAR REEDUCATION: CPT

## 2020-11-20 NOTE — PROGRESS NOTES
[facility]  [address]  [place], [address]  [phone] fax    Physical Therapy Progress Note  Patient name: Winston Chiang Start of Care: 10/20/2020   Referral source: Kristyn Trujillo,* : 1990   Medical/Treatment Diagnosis: Right knee pain [M25.561]  Payor: BLUE CROSS MEDICAID / Plan: [facility] [employer]KEEPERS PLUS / Product Type: Managed Care Medicaid /  Onset Date:>1 month ago     Prior Hospitalization: see medical history Provider#: [id number]   Medications: Verified on Patient Summary List    Comorbidities: no co-morbidities  Prior Level of Function:independent ADLs, no knee pain  Visits from Start of Care: 6    Missed Visits: 1      Key Functional Changes:     FOTO decreased to 40% however pt reports increasing activity as things are easier to perform. Pt reports subjective 75% overall improvement. Right SLS on airex for 30\", occasional handrail support. Demonstrates improved squat mechanics and pt reports less pain associated with squats. Short Term Goals: To be accomplished in 2 weeks:  1. Pt will report compliance with HEP, performing at least once daily, to maximize therapeutic success.              Eval: HEP assigned              Current: progressing, stretching more often at home (2020)  [address] [facility] be accomplished in 4 weeks:  1. Pt will improve FOTO to 69, demonstrating improved functional capacity for ADLs.             Eval: 60              Current: Decreased to 40% however pt reports increasing activity as things are easier to perform. 2020  2. Pt will improve right LE strength to 4+/5 MMT or better without pain increase to improve ease of return to work without restrictions.              Eval: knee flex/ext 4-/5, hip flex 4-/5 with pain, hip ext 3+/5, hip abd 4-/5              Current: MMT Right Knee flex 4/5, ext 4-/5, hip flex 4/5, ext 4-/5, abd 4-/5.  2020  3.  Pt will improve right knee flexion to 130 degrees or better without pain increase to improve was of squatting and lifting at work.  Karena Davis: 0-124 degrees with pain              Current: AROM Right knee 0-133 degrees without pain increasing. 11/3/2020  4. Pt will perform SLS on airex for 30 seconds or greater without LOB, demonstrating improved knee stability for dynamic ADLs.             Eval: not tested              [facility]: left LE 30 seconds, right LE 12 seconds firm ground (11/13/2020); Right SLS on airex for 30\", occasional handrail support. 11/20/2020  5. Pt will report pain no greater than 2/10 to improve QOL and ease of household tasks.               Eval: 5-10/10 pain              Current: progressing, 3/10 reported today (11/13/2020)    Recommend continue PT for additional 2x a week for 4 weeks to improve ease of performing functional activities. Updated Goals: to be achieved in 4 weeks:  1. Pt will improve FOTO to 69, demonstrating improved functional capacity for ADLs.             BX: Decreased to 40% however pt reports increasing activity as things are easier to perform. 2. Pt will improve right LE strength to 4+/5 MMT or better without pain increase to improve ease of return to work without restrictions.              PN: MMT Right Knee flex 4/5, ext 4-/5, hip flex 4/5, ext 4-/5, abd 4-/5. 3. Pt will improve right knee flexion to 130 degrees or better without pain increase to improve was of squatting and lifting at work.              PN: AROM Right knee 0-133 degrees without pain increasing. 4. Pt will perform SLS on airex for 30 seconds or greater without LOB, demonstrating improved knee stability for dynamic ADLs.             PN: left LE 30 seconds,  Right SLS on airex for 30\", occasional handrail support.    5. Pt will report pain no greater than 2/10 to improve QOL and ease of household tasks.               PN: progressing, 3/10 reported today     ASSESSMENT/RECOMMENDATIONS:  [x]Continue therapy per initial plan/protocol at a frequency of  2 x per week for 4 weeks  []Continue therapy with the following recommended changes:_____________________      _____________________________________________________________________  []Discontinue therapy progressing towards or have reached established goals  []Discontinue therapy due to lack of appreciable progress towards goals  []Discontinue therapy due to lack of attendance or compliance  []Await Physician's recommendations/decisions regarding therapy  []Other:________________________________________________________________    Thank you for this referral.    Dayan Heard, MARYAM 11/20/2020 11:33 AM  NOTE TO PHYSICIAN:  [address]   [facility]: [phone]  If you are unable to process this request in 24 hours please contact our office: [phone]    ? I have read the above report and request that my patient continue as recommended. ? I have read the above report and request that my patient continue therapy with the following changes/special instructions:__________________________________________________________  ? I have read the above report and request that my patient be discharged from therapy.     Physicians signature: ______________________________Date: ______Time:______

## 2020-11-20 NOTE — PROGRESS NOTES
PT DAILY TREATMENT NOTE     Patient Name: Kam Huston  Date:2020  : 1990  [x]  Patient  Verified  Payor: BLUE CROSS MEDICAID / Plan: [facility] HEALTHKEEPERS PLUS / Product Type: Managed Care Medicaid /    In time:10:46  Out time:11:24  Total Treatment Time (min): 38  Visit #: 6 of 8    Treatment Area: Right knee pain [M25.561]    SUBJECTIVE  Pain Level (0-10 scale): 0/10  Any medication changes, allergies to medications, adverse drug reactions, diagnosis change, or new procedure performed?: [x] No    [] Yes (see summary sheet for update)  Subjective functional status/changes:   [] No changes reported  \"Doing better. No pain right now. \"    OBJECTIVE    22 min Therapeutic Exercise:  [x] See flow sheet :   Rationale: increase ROM and increase strength to improve the patients ability to perform ADL's.    8 min Therapeutic Activity:  [x]  See flow sheet : Bandwalks, negotiating stairs. Rationale: increase strength and increase proprioception  to improve the patients ability to perform work related tasks. 8 min Neuromuscular Re-education:  [x]  See flow sheet : SLS, eccentric step downs   Rationale: increase strength, improve balance and increase proprioception  to improve the patients ability to perform functional activities. With   [x] TE   [] TA   [] neuro   [] other: Patient Education: [x] Review HEP    [] Progressed/Changed HEP based on:   [] positioning   [] body mechanics   [] transfers   [] heat/ice application    [] other:      Other Objective/Functional Measures: FOTO decreased to 40% however pt reports increasing activity as things are easier to perform. Pt reports subjective 75% overall improvement. Right SLS on airex for 30\", occasional handrail support. Demonstrates improved squat mechanics and pt reports less pain associated with squats. Recommend continue PT for additional 2x a week for 4 weeks to improve ease of performing functional activities.     Pain Level (0-10 scale) post treatment: 3/10    ASSESSMENT/Changes in Function:      []  See Plan of Care  [x]  See progress note/recertification  []  See Discharge Summary         Progress towards goals / Updated goals:  Short Term Goals: To be accomplished in 2 weeks:  1. Pt will report compliance with HEP, performing at least once daily, to maximize therapeutic success.              Eval: HEP assigned              Current: progressing, stretching more often at home (2020)  [address] be accomplished in 4 weeks:  1. Pt will improve FOTO to 69, demonstrating improved functional capacity for ADLs.             Eval: 60   Current: Decreased to 40% however pt reports increasing activity as things are easier to perform. 2020  2. Pt will improve right LE strength to 4+/5 MMT or better without pain increase to improve ease of return to work without restrictions.              Eval: knee flex/ext 4-/5, hip flex 4-/5 with pain, hip ext 3+/5, hip abd 4-/5              Current: MMT Right Knee flex 4/5, ext 4-/5, hip flex 4/5, ext 4-/5, abd 4-/5.  2020  3. Pt will improve right knee flexion to 130 degrees or better without pain increase to improve was of squatting and lifting at work.  Rudell Lewis: 0-124 degrees with pain              Current: AROM Right knee 0-133 degrees without pain increasing. 11/3/2020  4. Pt will perform SLS on airex for 30 seconds or greater without LOB, demonstrating improved knee stability for dynamic ADLs.             Eval: not tested              [facility]: left LE 30 seconds, right LE 12 seconds firm ground (2020); Right SLS on airex for 30\", occasional handrail support. 2020  5.  Pt will report pain no greater than 2/10 to improve QOL and ease of household tasks.               Eval: 5-10/10 pain              Current: progressing, 3/10 reported today (2020)    PLAN  []  Upgrade activities as tolerated     [x]  Continue plan of care  []  Update interventions per flow sheet       []  Discharge due to:_  []  Other:_      Linda Huston, PTA 11/20/2020  10:48 AM    Future Appointments   Date Time Provider Zandra Rodrigez   11/24/2020 11:15 AM Ricky Turner PTA [facility]

## 2020-11-24 ENCOUNTER — HOSPITAL ENCOUNTER (OUTPATIENT)
Dept: PHYSICAL THERAPY | Age: 30
Discharge: HOME | End: 2020-11-24
Payer: MEDICAID

## 2020-11-24 PROCEDURE — 97112 NEUROMUSCULAR REEDUCATION: CPT

## 2020-11-24 PROCEDURE — 97530 THERAPEUTIC ACTIVITIES: CPT

## 2020-11-24 PROCEDURE — 97110 THERAPEUTIC EXERCISES: CPT

## 2020-11-24 NOTE — PROGRESS NOTES
PT DAILY TREATMENT NOTE     Patient Name: Kala Cordova  Date:2020  : 1990  [x]  Patient  Verified  Payor: BLUE CROSS MEDICAID / Plan: [facility] [employer] / Product Type: Managed Care Medicaid /    In time:11:15  Out time:12:08  Total Treatment Time (min): 48  Visit #: 1 of 8    Treatment Area: Right knee pain [M25.561]    SUBJECTIVE  Pain Level (0-10 scale): 3  Any medication changes, allergies to medications, adverse drug reactions, diagnosis change, or new procedure performed?: [x] No    [] Yes (see summary sheet for update)  Subjective functional status/changes:   [] No changes reported  Pt reports she is doing fine and she feels like her right knee is getting better. OBJECTIVE    Modality rationale: decrease edema, decrease inflammation and decrease pain to improve the patients ability to perform ADL's with ease.    Min Type Additional Details    [] Estim:  []Unatt       []IFC  []Premod                        []Other:  []w/ice   []w/heat  Position:  Location:    [] Estim: []Att    []TENS instruct  []NMES                    []Other:  []w/US   []w/ice   []w/heat  Position:  Location:    []  Traction: [] Cervical       []Lumbar                       [] Prone          []Supine                       []Intermittent   []Continuous Lbs:  [] before manual  [] after manual    []  Ultrasound: []Continuous   [] Pulsed                           []1MHz   []3MHz W/cm2:  Location:    []  Iontophoresis with dexamethasone         Location: [] Take home patch   [] In clinic   5 [x]  Ice     []  heat  []  Ice massage  []  Laser   []  Anodyne Position:seated  Location: right knee    []  Laser with stim  []  Other:  Position:  Location:    []  Vasopneumatic Device Pressure:       [] lo [] med [] hi   Temperature: [] lo [] med [] hi   [] Skin assessment post-treatment:  []intact []redness- no adverse reaction    []redness - adverse reaction:       20 min Therapeutic Exercise:  [x] See flow sheet :   Rationale: increase ROM and increase strength to improve the patients ability to perform functional task with ease. 13 min Therapeutic Activity:  [x]  See flow sheet :   Rationale: increase strength, improve coordination and improve balance  to improve the patients ability to perform daily task with ease     15 min Neuromuscular Re-education:  [x]  See flow sheet :   Rationale: increase strength, improve coordination, improve balance and increase proprioception  to improve the patients ability to tolerate ADL's. With   [] TE   [] TA   [] neuro   [] other: Patient Education: [x] Review HEP    [] Progressed/Changed HEP based on:   [] positioning   [] body mechanics   [] transfers   [] heat/ice application    [] other:      Other Objective/Functional Measures:      Pain Level (0-10 scale) post treatment: 2.5/10    ASSESSMENT/Changes in Function:   Pt continuing to progress well with no complaints of increasing pain with increased reps and resistance to therex. Some continued instability in the right knee noted throughout therex with pt also displaying intermittent instances of valgus of the right knee, however able to self correct after initial correction. Pt reports some continued discomfort at the anterior knee around to the medial knee at the joint line with WB after about 30 mins. Continued treatment to improve right knee strength and stability to improve ambulation ADL's. Patient will continue to benefit from skilled PT services to modify and progress therapeutic interventions, address functional mobility deficits, address ROM deficits, address strength deficits, analyze and address soft tissue restrictions, analyze and cue movement patterns, analyze and modify body mechanics/ergonomics and assess and modify postural abnormalities to attain remaining goals.      [x]  See Plan of Care  []  See progress note/recertification  []  See Discharge Summary         Progress towards goals / Updated goals:  Updated Goals: to be achieved in 4 weeks:  1. Pt will improve FOTO to 69, demonstrating improved functional capacity for ADLs.             IJ: Decreased to 40% however pt reports increasing activity as things are easier to perform. 2. Pt will improve right LE strength to 4+/5 MMT or better without pain increase to improve ease of return to work without restrictions.              PN: MMT Right Knee flex 4/5, ext 4-/5, hip flex 4/5, ext 4-/5, abd 4-/5. 3. Pt will improve right knee flexion to 130 degrees or better without pain increase to improve was of squatting and lifting at work.              PN: AROM Right knee 0-133 degrees without pain increasing. 4. Pt will perform SLS on airex for 30 seconds or greater without LOB, demonstrating improved knee stability for dynamic ADLs.             PN: left LE 30 seconds,  Right SLS on airex for 30\", occasional handrail support. 5. Pt will report pain no greater than 2/10 to improve QOL and ease of household tasks.               PN: progressing, 3/10 reported today     PLAN  []  Upgrade activities as tolerated     [x]  Continue plan of care  []  Update interventions per flow sheet       []  Discharge due to:_  []  Other:_      Muna Torres, MARYAM 11/24/2020  11:16 AM    No future appointments.

## 2020-12-08 ENCOUNTER — APPOINTMENT (OUTPATIENT)
Dept: PHYSICAL THERAPY | Age: 30
End: 2020-12-08

## 2020-12-18 ENCOUNTER — APPOINTMENT (OUTPATIENT)
Dept: PHYSICAL THERAPY | Age: 30
End: 2020-12-18

## 2021-01-05 NOTE — PROGRESS NOTES
[facility]  [address]  [place], [address]  [phone] fax    Physical Therapy Discharge Summary  Patient name: Tahir Dunlap Start of Care: 10/20/2020   Referral source: Kian Dos Santos,* : 1990   Medical/Treatment Diagnosis: Right knee pain [M25.561]  Payor: BLUE [employer]OSS MEDICAID / Plan: [address] [place] [address] / Product Type: Managed Care Medicaid /  Onset Date: > 1 month ago     Prior Hospitalization: see medical history Provider#: [id number]   Medications: Verified on Patient Summary List    Comorbidities: none  Prior Level of Function:independent ADLs, no knee pain  Visits from Start of Care: 7    Missed Visits: 5  Reporting Period : 2020 to 2020    Summary of Care:  1. Pt will improve FOTO to 69, demonstrating improved functional capacity for ADLs.             XX: Decreased to 40% however pt reports increasing activity as things are easier to perform. 2. Pt will improve right LE strength to 4+/5 MMT or better without pain increase to improve ease of return to work without restrictions.              PN: MMT Right Knee flex 4/5, ext 4-/5, hip flex 4/5, ext 4-/5, abd 4-/5. 3. Pt will improve right knee flexion to 130 degrees or better without pain increase to improve was of squatting and lifting at work.              PN: AROM Right knee 0-133 degrees without pain increasing. 4. Pt will perform SLS on airex for 30 seconds or greater without LOB, demonstrating improved knee stability for dynamic ADLs.             PN: left LE 30 seconds,  Right SLS on airex for 30\", occasional handrail support. 5. Pt will report pain no greater than 2/10 to improve QOL and ease of household tasks.               PN: progressing, 3/10 reported today     ASSESSMENT/RECOMMENDATIONS:  Pt is being discharged at this time secondary to the clinic attendance policy. May return with new script.    [x]Discontinue therapy: []Patient has reached or is progressing toward set goals      [x]Patient is non-compliant or has abdicated      []Due to lack of appreciable progress towards set goals    Christopher Street, PT 1/5/2021 3:23 PM    NOTE TO PHYSICIAN:  Please complete the following and fax to: [facility] at [phone]  . Retain this original for your records. If you are unable to process this request in   24 hours, please contact our office.      [] I have read the above report and request that my patient continue therapy with the following changes/special instructions:  [] I have read the above report and request that my patient be discharged from therapy    Physician's Signature:____________Date:_________TIME:________    ** Signature, Date and Time must be completed for valid certification **

## 2022-12-01 NOTE — TELEPHONE ENCOUNTER
Pt call and requested a refill on Depo. Last given was 1/9/18, pt is 4 months  past due for injection. Last well woman-5/7/18. Orbital.../Triceps.../Buccal...